# Patient Record
Sex: FEMALE | Race: OTHER
[De-identification: names, ages, dates, MRNs, and addresses within clinical notes are randomized per-mention and may not be internally consistent; named-entity substitution may affect disease eponyms.]

---

## 2017-03-15 ENCOUNTER — HOSPITAL ENCOUNTER (OUTPATIENT)
Dept: HOSPITAL 23 - CMRI | Age: 42
Discharge: HOME | End: 2017-03-15
Payer: COMMERCIAL

## 2017-03-15 DIAGNOSIS — M47.816: ICD-10-CM

## 2017-03-15 DIAGNOSIS — M54.5: Primary | ICD-10-CM

## 2017-03-20 ENCOUNTER — OFFICE VISIT (OUTPATIENT)
Dept: OBSTETRICS AND GYNECOLOGY | Facility: CLINIC | Age: 42
End: 2017-03-20

## 2017-03-20 VITALS — WEIGHT: 225 LBS | HEIGHT: 64 IN | BODY MASS INDEX: 38.41 KG/M2

## 2017-03-20 DIAGNOSIS — N85.2 UTERINE ENLARGEMENT: ICD-10-CM

## 2017-03-20 DIAGNOSIS — N93.9 ABNORMAL UTERINE BLEEDING (AUB): Primary | ICD-10-CM

## 2017-03-20 PROCEDURE — 99203 OFFICE O/P NEW LOW 30 MIN: CPT | Performed by: OBSTETRICS & GYNECOLOGY

## 2017-03-20 RX ORDER — AMLODIPINE BESYLATE 5 MG/1
TABLET ORAL
Refills: 3 | COMMUNITY
Start: 2017-02-04 | End: 2017-09-18 | Stop reason: SDUPTHER

## 2017-03-20 RX ORDER — DICLOFENAC SODIUM 75 MG/1
TABLET, DELAYED RELEASE ORAL
Refills: 0 | COMMUNITY
Start: 2017-02-28 | End: 2017-09-18

## 2017-03-20 RX ORDER — CEPHALEXIN 250 MG/1
CAPSULE ORAL
Refills: 3 | COMMUNITY
Start: 2017-02-04 | End: 2017-09-18

## 2017-03-20 RX ORDER — TIZANIDINE 4 MG/1
TABLET ORAL
Refills: 0 | COMMUNITY
Start: 2017-02-21 | End: 2017-09-18

## 2017-03-20 NOTE — PROGRESS NOTES
Subjective   Susan Gomes is a 42 y.o. female  5, Para 2 AB 3, Living 2.  Last annual 1y, last pap 1y, last mammogram 1y, last colonoscopy 0.  Cc: Heavy bleeding  History of Present Illness  Patient been having heavy menses since  without workup.  His occur every 28 days but last approximately 78 days with heavy flow and passage of clots and severe dysmenorrhea.  Patient denies dyspareunia.  The following portions of the patient's history were reviewed and updated as appropriate: allergies, current medications, past family history, past medical history, past social history, past surgical history and problem list.    Review of Systems   Genitourinary: Positive for menstrual problem.        Dysmenorrhea   All other systems reviewed and are negative.        Past Medical History   Diagnosis Date   • Asthma    • Hypothyroidism      Menstrual History:  OB History      Para Term  AB TAB SAB Ectopic Multiple Living    5 2 2  3 3    2         Menarche age: 10  Patient's last menstrual period was 2017.  Period Duration (Days): 7  Period Pattern: Regular  Menstrual Flow: Moderate, Heavy  Menstrual Control: Maxi pad  Dysmenorrhea: (!) Severe  Dysmenorrhea Symptoms: Cramping, Nausea, Diarrhea, Headache    Past Surgical History   Procedure Laterality Date   • Cholecystectomy     • Thyroidectomy, partial     •  section with tubal     • D&c with suction       ×3     OB History      Para Term  AB TAB SAB Ectopic Multiple Living    5 2 2  3 3    2        Family History   Problem Relation Age of Onset   • Uterine cancer Mother 65     History   Smoking Status   • Never Smoker   Smokeless Tobacco   • Never Used     History   Alcohol Use No     Health Maintenance   Topic Date Due   • TDAP/TD VACCINES (1 - Tdap) 1992   • INFLUENZA VACCINE  2016       Current Outpatient Prescriptions:   •  amLODIPine (NORVASC) 5 MG tablet, TK 1 T PO QD, Disp: , Rfl: 3  •  cephalexin  "(KEFLEX) 250 MG capsule, TK 1 C PO QHS, Disp: , Rfl: 3  •  cetirizine (ZyrTEC) 10 MG tablet, Take 10 mg by mouth Daily., Disp: , Rfl:   •  Cyanocobalamin (B-12) 1000 MCG sublingual tablet, DIS 1 T UNT D, Disp: , Rfl: 0  •  diclofenac (VOLTAREN) 75 MG EC tablet, TAKE 1 TABLET BY MOUTH DAILY, Disp: , Rfl: 0  •  dicyclomine (BENTYL) 10 MG capsule, TK 1 C PO BID PRN, Disp: , Rfl: 0  •  levothyroxine (SYNTHROID, LEVOTHROID) 112 MCG tablet, TK 1 T PO D UTD, Disp: , Rfl: 2  •  omeprazole (PriLOSEC) 40 MG capsule, TK 1 C PO D, Disp: , Rfl: 0  •  Probiotic Product (PROBIOTIC MATURE ADULT) capsule, TK ONE C PO QD, Disp: , Rfl: 2  •  SYMBICORT 160-4.5 MCG/ACT inhaler, INL 2 PFS PO BID, Disp: , Rfl: 2  •  tiZANidine (ZANAFLEX) 4 MG tablet, TK 1 T PO BID PRN, Disp: , Rfl: 0  Sexual History: Active    STD negative    Objective   Vitals:    03/20/17 0953   Weight: 225 lb (102 kg)   Height: 64\" (162.6 cm)     Physical Exam   Constitutional: She is oriented to person, place, and time. She appears well-developed and well-nourished.   Morbid obesity   HENT:   Head: Normocephalic.   Eyes: Pupils are equal, round, and reactive to light.   Neck: Normal range of motion. No thyromegaly present.   Cardiovascular: Normal rate, regular rhythm, normal heart sounds and intact distal pulses.    Pulmonary/Chest: Effort normal and breath sounds normal. No respiratory distress. She exhibits no tenderness. Right breast exhibits no inverted nipple, no mass, no nipple discharge, no skin change and no tenderness. Left breast exhibits no inverted nipple, no mass, no nipple discharge, no skin change and no tenderness. Breasts are symmetrical.   Abdominal: Soft. Bowel sounds are normal. Hernia confirmed negative in the right inguinal area and confirmed negative in the left inguinal area.   Genitourinary: Rectum normal and vagina normal. Rectal exam shows no external hemorrhoid, no internal hemorrhoid, no fissure, no mass, no tenderness and anal tone " normal. No breast tenderness or discharge. Pelvic exam was performed with patient supine. There is no rash, tenderness, lesion or injury on the right labia. There is no rash, tenderness, lesion or injury on the left labia. Uterus is not enlarged and not tender. Cervix exhibits no motion tenderness, no discharge and no friability. Right adnexum displays no mass, no tenderness and no fullness. Left adnexum displays no mass, no tenderness and no fullness.   Genitourinary Comments: Examination is suboptimal due to body habitus but I get the impression that the uterus is enlarged.  old bleeding identified in the vault   Lymphadenopathy:     She has no cervical adenopathy.        Right: No inguinal adenopathy present.        Left: No inguinal adenopathy present.   Neurological: She is alert and oriented to person, place, and time. She has normal reflexes.   Skin: Skin is warm and dry.   Psychiatric: She has a normal mood and affect. Her behavior is normal. Judgment and thought content normal.   Vitals reviewed.        Assessment/Plan   Susan was seen today for menometrorrhagia.    Diagnoses and all orders for this visit:    Menorrhagia  -     US Pelvis Complete    Uterine enlargement

## 2017-03-27 ENCOUNTER — PROCEDURE VISIT (OUTPATIENT)
Dept: OBSTETRICS AND GYNECOLOGY | Facility: CLINIC | Age: 42
End: 2017-03-27

## 2017-03-27 ENCOUNTER — OFFICE VISIT (OUTPATIENT)
Dept: OBSTETRICS AND GYNECOLOGY | Facility: CLINIC | Age: 42
End: 2017-03-27

## 2017-03-27 VITALS
SYSTOLIC BLOOD PRESSURE: 115 MMHG | BODY MASS INDEX: 38.76 KG/M2 | HEART RATE: 84 BPM | WEIGHT: 227 LBS | HEIGHT: 64 IN | DIASTOLIC BLOOD PRESSURE: 72 MMHG

## 2017-03-27 DIAGNOSIS — N92.0 MENORRHAGIA WITH REGULAR CYCLE: Primary | ICD-10-CM

## 2017-03-27 DIAGNOSIS — N93.9 ABNORMAL UTERINE BLEEDING (AUB): Primary | ICD-10-CM

## 2017-03-27 DIAGNOSIS — D25.9 UTERINE LEIOMYOMA, UNSPECIFIED LOCATION: ICD-10-CM

## 2017-03-27 DIAGNOSIS — N85.2 ENLARGED UTERUS: ICD-10-CM

## 2017-03-27 DIAGNOSIS — E66.01 MORBID OBESITY, UNSPECIFIED OBESITY TYPE (HCC): ICD-10-CM

## 2017-03-27 DIAGNOSIS — N92.0 MENORRHAGIA WITH REGULAR CYCLE: ICD-10-CM

## 2017-03-27 PROCEDURE — 76830 TRANSVAGINAL US NON-OB: CPT | Performed by: OBSTETRICS & GYNECOLOGY

## 2017-03-27 PROCEDURE — 58100 BIOPSY OF UTERUS LINING: CPT | Performed by: OBSTETRICS & GYNECOLOGY

## 2017-03-27 NOTE — PROGRESS NOTES
Endometrial Biopsy Procedure Note  She sound demonstrated a 2.6 cm posterior fundal fibroid and endometrial thickening of 9 mm  Pre-operative Diagn nausea osis: Menorrhagia    Post-operative Diagnosis: same    Indications: abnormal uterine bleeding, enlarged uterus    Procedure Details    Urine pregnancy test was done today and result was negative.  The risks (including infection, bleeding, pain, and uterine perforation) and benefits of the procedure were explained to the patient and Verbal informed consent was obtained.  Antibiotic prophylaxis against endocarditis was not indicated.     The patient was placed in the dorsal lithotomy position.  Bimanual exam showed the uterus to be in the anteroflexed position.  A Graves' speculum inserted in the vagina, and the cervix prepped with povidone iodine.  Endocervical curettage with a Kevorkian curette was not performed.     A sharp tenaculum was not applied to the anterior lip of the cervix for stabilization.  A sterile uterine sound was used to sound the uterus to a depth of 11.5 cm.  A Pipelle endometrial aspirator was used to sample the endometrium.  Sample was sent for pathologic examination.    Condition:  Stable    Complications:  None    Plan:    The patient was advised to call for any fever or for prolonged or severe pain or bleeding. She was advised to use OTC analgesics as needed for mild to moderate pain. She was advised to avoid vaginal intercourse for 48 hours or until the bleeding has completely stopped.    Attending Physician Documentation:  I was present for or participated in the entire procedure, including opening and closing.     Patient will return to clinic in approximately 2 weeks for discussion of endometrial ablation

## 2017-03-28 LAB
DX ICD CODE: NORMAL
DX ICD CODE: NORMAL
PATH REPORT.FINAL DX SPEC: NORMAL
PATH REPORT.GROSS SPEC: NORMAL
PATH REPORT.RELEVANT HX SPEC: NORMAL
PATH REPORT.SITE OF ORIGIN SPEC: NORMAL
PATHOLOGIST NAME: NORMAL
PAYMENT PROCEDURE: NORMAL

## 2017-04-24 ENCOUNTER — OFFICE VISIT (OUTPATIENT)
Dept: OBSTETRICS AND GYNECOLOGY | Facility: CLINIC | Age: 42
End: 2017-04-24

## 2017-04-24 VITALS
HEART RATE: 81 BPM | HEIGHT: 64 IN | WEIGHT: 224.6 LBS | SYSTOLIC BLOOD PRESSURE: 132 MMHG | BODY MASS INDEX: 38.35 KG/M2 | DIASTOLIC BLOOD PRESSURE: 80 MMHG

## 2017-04-24 DIAGNOSIS — N92.0 MENORRHAGIA WITH REGULAR CYCLE: Primary | ICD-10-CM

## 2017-04-24 PROCEDURE — 99213 OFFICE O/P EST LOW 20 MIN: CPT | Performed by: OBSTETRICS & GYNECOLOGY

## 2017-04-24 NOTE — PROGRESS NOTES
Subjective   Susan Gomes is a 42 y.o. female.   Cc: Follow-up after endometrial biopsy  History of Present Illness  Biopsy showed weakly proliferative endometrium without evidence of hyperplasia  The following portions of the patient's history were reviewed and updated as appropriate: allergies, current medications, past family history, past medical history, past social history, past surgical history and problem list.    Review of Systems   Constitutional: Positive for fatigue.   All other systems reviewed and are negative.      Objective   Physical Exam   Constitutional: She is oriented to person, place, and time. She appears well-developed and well-nourished.   Neurological: She is alert and oriented to person, place, and time.   Skin: Skin is warm and dry.   Psychiatric: She has a normal mood and affect. Her behavior is normal. Judgment and thought content normal.   Vitals reviewed.      Assessment/Plan   Susan was seen today for follow-up.    Diagnoses and all orders for this visit:    Menorrhagia with regular cycle  Comments:  Try to cycle with OCPs initially with surgical backup as needed    Other orders  -     norgestrel-ethinyl estradiol (LOW-OGESTREL,CRYSELLE) 0.3-30 MG-MCG per tablet; Take 1 tablet by mouth Daily.

## 2017-06-12 ENCOUNTER — HOSPITAL ENCOUNTER (OUTPATIENT)
Dept: HOSPITAL 23 - CWCC | Age: 42
Discharge: HOME | End: 2017-06-12
Attending: FAMILY MEDICINE
Payer: COMMERCIAL

## 2017-06-12 DIAGNOSIS — Z12.31: Primary | ICD-10-CM

## 2017-06-12 PROCEDURE — G0202 SCR MAMMO BI INCL CAD: HCPCS

## 2017-08-22 ENCOUNTER — OFFICE VISIT (OUTPATIENT)
Dept: OBSTETRICS AND GYNECOLOGY | Facility: CLINIC | Age: 42
End: 2017-08-22

## 2017-08-22 VITALS
BODY MASS INDEX: 37.56 KG/M2 | HEART RATE: 90 BPM | DIASTOLIC BLOOD PRESSURE: 75 MMHG | WEIGHT: 220 LBS | HEIGHT: 64 IN | SYSTOLIC BLOOD PRESSURE: 117 MMHG

## 2017-08-22 DIAGNOSIS — N92.0 MENORRHAGIA WITH REGULAR CYCLE: Primary | ICD-10-CM

## 2017-08-22 PROCEDURE — 99213 OFFICE O/P EST LOW 20 MIN: CPT | Performed by: OBSTETRICS & GYNECOLOGY

## 2017-08-22 NOTE — PROGRESS NOTES
Subjective   Susan Gomes is a 42 y.o. female.   EC: Menorrhagia  History of Present Illness  Patient did not respond satisfactorily to OCPs and wants endometrial ablation  The following portions of the patient's history were reviewed and updated as appropriate: allergies, current medications, past family history, past medical history, past social history, past surgical history and problem list.    Review of Systems   Genitourinary: Positive for menstrual problem.   All other systems reviewed and are negative.      Objective   Physical Exam   Constitutional: She is oriented to person, place, and time. She appears well-developed and well-nourished.   Neurological: She is alert and oriented to person, place, and time.   Skin: Skin is warm and dry.   Psychiatric: She has a normal mood and affect. Her behavior is normal. Judgment and thought content normal.   Vitals reviewed.      Assessment/Plan   Susan was seen today for follow-up.    Diagnoses and all orders for this visit:    Menorrhagia with regular cycle  -     Case Request       Patient understands risks benefits side effects complications alternatives  and failure rate of the procedure.  She understands risks of anesthesia blood loss infection.  She understands risk albeit rare of  death blood clots.  DVT, stroke.  She accepts these risks and has elected to proceed with the procedure.

## 2017-08-24 PROBLEM — N92.0 MENORRHAGIA WITH REGULAR CYCLE: Status: ACTIVE | Noted: 2017-08-24

## 2017-09-18 ENCOUNTER — APPOINTMENT (OUTPATIENT)
Dept: PREADMISSION TESTING | Facility: HOSPITAL | Age: 42
End: 2017-09-18

## 2017-09-18 VITALS
SYSTOLIC BLOOD PRESSURE: 135 MMHG | TEMPERATURE: 98.1 F | RESPIRATION RATE: 20 BRPM | WEIGHT: 215.31 LBS | HEART RATE: 76 BPM | HEIGHT: 64 IN | OXYGEN SATURATION: 99 % | DIASTOLIC BLOOD PRESSURE: 86 MMHG | BODY MASS INDEX: 36.76 KG/M2

## 2017-09-18 LAB
ANION GAP SERPL CALCULATED.3IONS-SCNC: 13.9 MMOL/L
BACTERIA UR QL AUTO: ABNORMAL /HPF
BILIRUB UR QL STRIP: NEGATIVE
BUN BLD-MCNC: 15 MG/DL (ref 6–20)
BUN/CREAT SERPL: 26.8 (ref 7–25)
CALCIUM SPEC-SCNC: 9.4 MG/DL (ref 8.6–10.5)
CHLORIDE SERPL-SCNC: 101 MMOL/L (ref 98–107)
CLARITY UR: CLEAR
CO2 SERPL-SCNC: 22.1 MMOL/L (ref 22–29)
COLOR UR: YELLOW
CREAT BLD-MCNC: 0.56 MG/DL (ref 0.57–1)
DEPRECATED RDW RBC AUTO: 45.6 FL (ref 37–54)
ERYTHROCYTE [DISTWIDTH] IN BLOOD BY AUTOMATED COUNT: 15.9 % (ref 11.7–13)
GFR SERPL CREATININE-BSD FRML MDRD: 118 ML/MIN/1.73
GLUCOSE BLD-MCNC: 91 MG/DL (ref 65–99)
GLUCOSE UR STRIP-MCNC: NEGATIVE MG/DL
HCG SERPL QL: NEGATIVE
HCT VFR BLD AUTO: 37.8 % (ref 35.6–45.5)
HGB BLD-MCNC: 12.1 G/DL (ref 11.9–15.5)
HGB UR QL STRIP.AUTO: NEGATIVE
HYALINE CASTS UR QL AUTO: ABNORMAL /LPF
KETONES UR QL STRIP: NEGATIVE
LEUKOCYTE ESTERASE UR QL STRIP.AUTO: NEGATIVE
MCH RBC QN AUTO: 25.4 PG (ref 26.9–32)
MCHC RBC AUTO-ENTMCNC: 32 G/DL (ref 32.4–36.3)
MCV RBC AUTO: 79.2 FL (ref 80.5–98.2)
NITRITE UR QL STRIP: NEGATIVE
PH UR STRIP.AUTO: 7 [PH] (ref 5–8)
PLATELET # BLD AUTO: 332 10*3/MM3 (ref 140–500)
PMV BLD AUTO: 10.4 FL (ref 6–12)
POTASSIUM BLD-SCNC: 4 MMOL/L (ref 3.5–5.2)
PROT UR QL STRIP: NEGATIVE
RBC # BLD AUTO: 4.77 10*6/MM3 (ref 3.9–5.2)
RBC # UR: ABNORMAL /HPF
REF LAB TEST METHOD: ABNORMAL
SODIUM BLD-SCNC: 137 MMOL/L (ref 136–145)
SP GR UR STRIP: 1.02 (ref 1–1.03)
SQUAMOUS #/AREA URNS HPF: ABNORMAL /HPF
UROBILINOGEN UR QL STRIP: NORMAL
WBC NRBC COR # BLD: 10.09 10*3/MM3 (ref 4.5–10.7)
WBC UR QL AUTO: ABNORMAL /HPF

## 2017-09-18 PROCEDURE — 36415 COLL VENOUS BLD VENIPUNCTURE: CPT

## 2017-09-18 PROCEDURE — 80048 BASIC METABOLIC PNL TOTAL CA: CPT | Performed by: OBSTETRICS & GYNECOLOGY

## 2017-09-18 PROCEDURE — 93005 ELECTROCARDIOGRAM TRACING: CPT

## 2017-09-18 PROCEDURE — 81001 URINALYSIS AUTO W/SCOPE: CPT | Performed by: OBSTETRICS & GYNECOLOGY

## 2017-09-18 PROCEDURE — 84703 CHORIONIC GONADOTROPIN ASSAY: CPT | Performed by: OBSTETRICS & GYNECOLOGY

## 2017-09-18 PROCEDURE — 87086 URINE CULTURE/COLONY COUNT: CPT | Performed by: OBSTETRICS & GYNECOLOGY

## 2017-09-18 PROCEDURE — 85027 COMPLETE CBC AUTOMATED: CPT | Performed by: OBSTETRICS & GYNECOLOGY

## 2017-09-18 PROCEDURE — 93010 ELECTROCARDIOGRAM REPORT: CPT | Performed by: INTERNAL MEDICINE

## 2017-09-18 RX ORDER — AMLODIPINE BESYLATE 5 MG/1
5 TABLET ORAL EVERY MORNING
COMMUNITY

## 2017-09-18 RX ORDER — BUDESONIDE AND FORMOTEROL FUMARATE DIHYDRATE 160; 4.5 UG/1; UG/1
2 AEROSOL RESPIRATORY (INHALATION) AS NEEDED
COMMUNITY

## 2017-09-18 RX ORDER — LEVOTHYROXINE SODIUM 112 UG/1
112 TABLET ORAL EVERY MORNING
COMMUNITY

## 2017-09-18 RX ORDER — NICOTINE POLACRILEX 2 MG
1 MINI LOZENGE BUCCAL EVERY EVENING
COMMUNITY

## 2017-09-18 NOTE — DISCHARGE INSTRUCTIONS
Take the following medications the morning of surgery with a small sip of water:    AMLODIPINE   AND LEVOTHYROXINE    General Instructions:  • Do not eat solid food after midnight the night before surgery.  • You may drink clear liquids day of surgery but must stop at least one hour before your hospital arrival time. (1000 AM )  • It is beneficial for you to have a clear drink that contains carbohydrates the day of surgery.  We suggest a 20 ounce bottle of Gatorade or Powerade for non-diabetic patients     Clear liquids are liquids you can see through.  Nothing red in color.     Plain water                               Sports drinks  Sodas                                   Gelatin (Jell-O)  Fruit juices without pulp such as white grape juice and apple juice  Popsicles that contain no fruit or yogurt  Tea or coffee (no cream or milk added)  Gatorade / Powerade  G2 / Powerade Zero      • Patients who avoid smoking, chewing tobacco and alcohol for 4 weeks prior to surgery have a reduced risk of post-operative complications.  Quit smoking as many days before surgery as you can.  • Do not smoke, use chewing tobacco or drink alcohol the day of surgery.   • Bring any papers given to you in the doctor’s office.  • Wear clean comfortable clothes and socks.  • Do not wear contact lenses or make-up.  Bring a case for your glasses.   • Leave all other valuables and jewelry at home.  • The Pre-Admission Testing nurse will instruct you to bring medications if unable to obtain an accurate list in Pre-Admission Testing.   • REPORT TO MAIN SURGERY ON 9- AT 1100       Preventing a Surgical Site Infection:  • For 2 to 3 days before surgery, avoid shaving with a razor because the razor can irritate skin and make it easier to develop an infection.  • The night prior to surgery sleep in a clean bed with clean clothing.  Do not allow pets to sleep with you.  • Shower on the morning of surgery using a fresh bar of anti-bacterial  soap (such as Dial) and clean washcloth.  Dry with a clean towel and dress in clean clothing.  • Ask your surgeon if you will be receiving antibiotics prior to surgery.  • Make sure you, your family, and all healthcare providers clean their hands with soap and water or an alcohol based hand  before caring for you or your wound.    Day of surgery:  Upon arrival, a Pre-op nurse and Anesthesiologist will review your health history, obtain vital signs, and answer questions you may have.  The only belongings needed at this time will be your home medications and if applicable your C-PAP/BI-PAP machine.  If you are staying overnight your family can leave the rest of your belongings in the car and bring them to your room later.  A Pre-op nurse will start an IV and you may receive medication in preparation for surgery, including something to help you relax.  Your family will be able to see you in the Pre-op area.  While you are in surgery your family should notify the waiting room  if they leave the waiting room area and provide a contact phone number.    Please be aware that surgery does come with discomfort.  We want to make every effort to control your discomfort so please discuss any uncontrolled symptoms with your nurse.   Your doctor will most likely have prescribed pain medications.      If you are going home after surgery you will receive individualized written care instructions before being discharged.  A responsible adult must drive you to and from the hospital on the day of your surgery and stay with you for 24 hours.    If you have any questions please call Pre-Admission Testing at [phone]  .  Deductibles and co-payments are collected on the day of service. Please be prepared to pay the required co-pay, deductible or deposit on the day of service as defined by your plan.

## 2017-09-20 LAB — BACTERIA SPEC AEROBE CULT: NO GROWTH

## 2017-09-22 ENCOUNTER — ANESTHESIA (OUTPATIENT)
Dept: PERIOP | Facility: HOSPITAL | Age: 42
End: 2017-09-22

## 2017-09-22 ENCOUNTER — HOSPITAL ENCOUNTER (OUTPATIENT)
Facility: HOSPITAL | Age: 42
Setting detail: HOSPITAL OUTPATIENT SURGERY
Discharge: HOME | End: 2017-09-22
Attending: OBSTETRICS & GYNECOLOGY | Admitting: OBSTETRICS & GYNECOLOGY

## 2017-09-22 ENCOUNTER — ANESTHESIA EVENT (OUTPATIENT)
Dept: PERIOP | Facility: HOSPITAL | Age: 42
End: 2017-09-22

## 2017-09-22 VITALS
BODY MASS INDEX: 36.74 KG/M2 | OXYGEN SATURATION: 99 % | WEIGHT: 215.2 LBS | TEMPERATURE: 98.6 F | DIASTOLIC BLOOD PRESSURE: 86 MMHG | HEART RATE: 76 BPM | HEIGHT: 64 IN | SYSTOLIC BLOOD PRESSURE: 146 MMHG | RESPIRATION RATE: 18 BRPM

## 2017-09-22 DIAGNOSIS — N92.0 MENORRHAGIA WITH REGULAR CYCLE: Primary | ICD-10-CM

## 2017-09-22 PROCEDURE — 25010000002 MIDAZOLAM PER 1 MG: Performed by: ANESTHESIOLOGY

## 2017-09-22 PROCEDURE — 25010000002 ONDANSETRON PER 1 MG: Performed by: NURSE ANESTHETIST, CERTIFIED REGISTERED

## 2017-09-22 PROCEDURE — 58563 HYSTEROSCOPY ABLATION: CPT | Performed by: OBSTETRICS & GYNECOLOGY

## 2017-09-22 PROCEDURE — 25010000002 PROPOFOL 10 MG/ML EMULSION: Performed by: NURSE ANESTHETIST, CERTIFIED REGISTERED

## 2017-09-22 PROCEDURE — 25010000002 FENTANYL CITRATE (PF) 100 MCG/2ML SOLUTION: Performed by: NURSE ANESTHETIST, CERTIFIED REGISTERED

## 2017-09-22 PROCEDURE — 25010000002 DEXAMETHASONE PER 1 MG: Performed by: NURSE ANESTHETIST, CERTIFIED REGISTERED

## 2017-09-22 PROCEDURE — 25010000002 SUCCINYLCHOLINE PER 20 MG: Performed by: NURSE ANESTHETIST, CERTIFIED REGISTERED

## 2017-09-22 PROCEDURE — S0260 H&P FOR SURGERY: HCPCS | Performed by: OBSTETRICS & GYNECOLOGY

## 2017-09-22 RX ORDER — LABETALOL HYDROCHLORIDE 5 MG/ML
5 INJECTION, SOLUTION INTRAVENOUS
Status: DISCONTINUED | OUTPATIENT
Start: 2017-09-22 | End: 2017-09-22 | Stop reason: HOSPADM

## 2017-09-22 RX ORDER — FLUMAZENIL 0.1 MG/ML
0.2 INJECTION INTRAVENOUS AS NEEDED
Status: DISCONTINUED | OUTPATIENT
Start: 2017-09-22 | End: 2017-09-22 | Stop reason: HOSPADM

## 2017-09-22 RX ORDER — FENTANYL CITRATE 50 UG/ML
50 INJECTION, SOLUTION INTRAMUSCULAR; INTRAVENOUS
Status: DISCONTINUED | OUTPATIENT
Start: 2017-09-22 | End: 2017-09-22 | Stop reason: HOSPADM

## 2017-09-22 RX ORDER — PROMETHAZINE HYDROCHLORIDE 25 MG/1
25 TABLET ORAL ONCE AS NEEDED
Status: DISCONTINUED | OUTPATIENT
Start: 2017-09-22 | End: 2017-09-22 | Stop reason: HOSPADM

## 2017-09-22 RX ORDER — HYDROCODONE BITARTRATE AND ACETAMINOPHEN 5; 325 MG/1; MG/1
1 TABLET ORAL ONCE AS NEEDED
Status: CANCELLED | OUTPATIENT
Start: 2017-09-22 | End: 2017-10-02

## 2017-09-22 RX ORDER — PROMETHAZINE HYDROCHLORIDE 25 MG/ML
5 INJECTION, SOLUTION INTRAMUSCULAR; INTRAVENOUS
Status: DISCONTINUED | OUTPATIENT
Start: 2017-09-22 | End: 2017-09-22 | Stop reason: HOSPADM

## 2017-09-22 RX ORDER — DEXAMETHASONE SODIUM PHOSPHATE 10 MG/ML
INJECTION INTRAMUSCULAR; INTRAVENOUS AS NEEDED
Status: DISCONTINUED | OUTPATIENT
Start: 2017-09-22 | End: 2017-09-22 | Stop reason: SURG

## 2017-09-22 RX ORDER — SODIUM CHLORIDE, SODIUM LACTATE, POTASSIUM CHLORIDE, CALCIUM CHLORIDE 600; 310; 30; 20 MG/100ML; MG/100ML; MG/100ML; MG/100ML
9 INJECTION, SOLUTION INTRAVENOUS CONTINUOUS
Status: DISCONTINUED | OUTPATIENT
Start: 2017-09-22 | End: 2017-09-22 | Stop reason: HOSPADM

## 2017-09-22 RX ORDER — MAGNESIUM HYDROXIDE 1200 MG/15ML
LIQUID ORAL AS NEEDED
Status: DISCONTINUED | OUTPATIENT
Start: 2017-09-22 | End: 2017-09-22 | Stop reason: HOSPADM

## 2017-09-22 RX ORDER — MIDAZOLAM HYDROCHLORIDE 1 MG/ML
1 INJECTION INTRAMUSCULAR; INTRAVENOUS
Status: DISCONTINUED | OUTPATIENT
Start: 2017-09-22 | End: 2017-09-22 | Stop reason: HOSPADM

## 2017-09-22 RX ORDER — PROMETHAZINE HYDROCHLORIDE 25 MG/1
12.5 TABLET ORAL ONCE AS NEEDED
Status: DISCONTINUED | OUTPATIENT
Start: 2017-09-22 | End: 2017-09-22 | Stop reason: HOSPADM

## 2017-09-22 RX ORDER — SUCCINYLCHOLINE CHLORIDE 20 MG/ML
INJECTION INTRAMUSCULAR; INTRAVENOUS AS NEEDED
Status: DISCONTINUED | OUTPATIENT
Start: 2017-09-22 | End: 2017-09-22 | Stop reason: SURG

## 2017-09-22 RX ORDER — NALOXONE HCL 0.4 MG/ML
0.2 VIAL (ML) INJECTION AS NEEDED
Status: DISCONTINUED | OUTPATIENT
Start: 2017-09-22 | End: 2017-09-22 | Stop reason: HOSPADM

## 2017-09-22 RX ORDER — MIDAZOLAM HYDROCHLORIDE 1 MG/ML
2 INJECTION INTRAMUSCULAR; INTRAVENOUS
Status: DISCONTINUED | OUTPATIENT
Start: 2017-09-22 | End: 2017-09-22 | Stop reason: HOSPADM

## 2017-09-22 RX ORDER — OXYCODONE AND ACETAMINOPHEN 7.5; 325 MG/1; MG/1
1 TABLET ORAL ONCE AS NEEDED
Status: DISCONTINUED | OUTPATIENT
Start: 2017-09-22 | End: 2017-09-22 | Stop reason: HOSPADM

## 2017-09-22 RX ORDER — DIPHENHYDRAMINE HYDROCHLORIDE 50 MG/ML
12.5 INJECTION INTRAMUSCULAR; INTRAVENOUS
Status: DISCONTINUED | OUTPATIENT
Start: 2017-09-22 | End: 2017-09-22 | Stop reason: HOSPADM

## 2017-09-22 RX ORDER — SODIUM CHLORIDE 0.9 % (FLUSH) 0.9 %
1-10 SYRINGE (ML) INJECTION AS NEEDED
Status: DISCONTINUED | OUTPATIENT
Start: 2017-09-22 | End: 2017-09-22 | Stop reason: HOSPADM

## 2017-09-22 RX ORDER — SODIUM CHLORIDE 9 MG/ML
INJECTION, SOLUTION INTRAVENOUS AS NEEDED
Status: DISCONTINUED | OUTPATIENT
Start: 2017-09-22 | End: 2017-09-22 | Stop reason: HOSPADM

## 2017-09-22 RX ORDER — ONDANSETRON 2 MG/ML
4 INJECTION INTRAMUSCULAR; INTRAVENOUS ONCE AS NEEDED
Status: DISCONTINUED | OUTPATIENT
Start: 2017-09-22 | End: 2017-09-22 | Stop reason: HOSPADM

## 2017-09-22 RX ORDER — FENTANYL CITRATE 50 UG/ML
INJECTION, SOLUTION INTRAMUSCULAR; INTRAVENOUS AS NEEDED
Status: DISCONTINUED | OUTPATIENT
Start: 2017-09-22 | End: 2017-09-22 | Stop reason: SURG

## 2017-09-22 RX ORDER — FAMOTIDINE 10 MG/ML
20 INJECTION, SOLUTION INTRAVENOUS ONCE
Status: COMPLETED | OUTPATIENT
Start: 2017-09-22 | End: 2017-09-22

## 2017-09-22 RX ORDER — LIDOCAINE HYDROCHLORIDE 20 MG/ML
INJECTION, SOLUTION INFILTRATION; PERINEURAL AS NEEDED
Status: DISCONTINUED | OUTPATIENT
Start: 2017-09-22 | End: 2017-09-22 | Stop reason: SURG

## 2017-09-22 RX ORDER — OXYCODONE HYDROCHLORIDE AND ACETAMINOPHEN 5; 325 MG/1; MG/1
1-2 TABLET ORAL EVERY 4 HOURS PRN
Qty: 20 TABLET | Refills: 0 | COMMUNITY
Start: 2017-09-22

## 2017-09-22 RX ORDER — PROMETHAZINE HYDROCHLORIDE 25 MG/ML
12.5 INJECTION, SOLUTION INTRAMUSCULAR; INTRAVENOUS ONCE AS NEEDED
Status: DISCONTINUED | OUTPATIENT
Start: 2017-09-22 | End: 2017-09-22 | Stop reason: HOSPADM

## 2017-09-22 RX ORDER — EPHEDRINE SULFATE 50 MG/ML
5 INJECTION, SOLUTION INTRAVENOUS ONCE AS NEEDED
Status: DISCONTINUED | OUTPATIENT
Start: 2017-09-22 | End: 2017-09-22 | Stop reason: HOSPADM

## 2017-09-22 RX ORDER — PROPOFOL 10 MG/ML
VIAL (ML) INTRAVENOUS AS NEEDED
Status: DISCONTINUED | OUTPATIENT
Start: 2017-09-22 | End: 2017-09-22 | Stop reason: SURG

## 2017-09-22 RX ORDER — HYDROCODONE BITARTRATE AND ACETAMINOPHEN 7.5; 325 MG/1; MG/1
1 TABLET ORAL ONCE AS NEEDED
Status: COMPLETED | OUTPATIENT
Start: 2017-09-22 | End: 2017-09-22

## 2017-09-22 RX ORDER — PROMETHAZINE HYDROCHLORIDE 25 MG/1
25 SUPPOSITORY RECTAL ONCE AS NEEDED
Status: DISCONTINUED | OUTPATIENT
Start: 2017-09-22 | End: 2017-09-22 | Stop reason: HOSPADM

## 2017-09-22 RX ORDER — HYDRALAZINE HYDROCHLORIDE 20 MG/ML
5 INJECTION INTRAMUSCULAR; INTRAVENOUS
Status: DISCONTINUED | OUTPATIENT
Start: 2017-09-22 | End: 2017-09-22 | Stop reason: HOSPADM

## 2017-09-22 RX ORDER — HYDROMORPHONE HYDROCHLORIDE 1 MG/ML
0.5 INJECTION, SOLUTION INTRAMUSCULAR; INTRAVENOUS; SUBCUTANEOUS
Status: DISCONTINUED | OUTPATIENT
Start: 2017-09-22 | End: 2017-09-22 | Stop reason: HOSPADM

## 2017-09-22 RX ORDER — ONDANSETRON 2 MG/ML
INJECTION INTRAMUSCULAR; INTRAVENOUS AS NEEDED
Status: DISCONTINUED | OUTPATIENT
Start: 2017-09-22 | End: 2017-09-22 | Stop reason: SURG

## 2017-09-22 RX ADMIN — PROPOFOL 200 MG: 10 INJECTION, EMULSION INTRAVENOUS at 13:11

## 2017-09-22 RX ADMIN — FAMOTIDINE 20 MG: 10 INJECTION, SOLUTION INTRAVENOUS at 12:48

## 2017-09-22 RX ADMIN — MIDAZOLAM 2 MG: 1 INJECTION INTRAMUSCULAR; INTRAVENOUS at 12:48

## 2017-09-22 RX ADMIN — FENTANYL CITRATE 50 MCG: 50 INJECTION INTRAMUSCULAR; INTRAVENOUS at 13:20

## 2017-09-22 RX ADMIN — LIDOCAINE HYDROCHLORIDE 100 MG: 20 INJECTION, SOLUTION INFILTRATION; PERINEURAL at 13:11

## 2017-09-22 RX ADMIN — SUCCINYLCHOLINE CHLORIDE 120 MG: 20 INJECTION, SOLUTION INTRAMUSCULAR; INTRAVENOUS; PARENTERAL at 13:11

## 2017-09-22 RX ADMIN — DEXAMETHASONE SODIUM PHOSPHATE 8 MG: 10 INJECTION INTRAMUSCULAR; INTRAVENOUS at 13:25

## 2017-09-22 RX ADMIN — HYDROCODONE BITARTRATE AND ACETAMINOPHEN 1 TABLET: 7.5; 325 TABLET ORAL at 14:37

## 2017-09-22 RX ADMIN — SODIUM CHLORIDE, POTASSIUM CHLORIDE, SODIUM LACTATE AND CALCIUM CHLORIDE 9 ML/HR: 600; 310; 30; 20 INJECTION, SOLUTION INTRAVENOUS at 12:48

## 2017-09-22 RX ADMIN — ONDANSETRON 4 MG: 2 INJECTION INTRAMUSCULAR; INTRAVENOUS at 13:32

## 2017-09-22 RX ADMIN — FENTANYL CITRATE 50 MCG: 50 INJECTION INTRAMUSCULAR; INTRAVENOUS at 13:11

## 2017-09-22 NOTE — ANESTHESIA PROCEDURE NOTES
Airway  Urgency: elective    Airway not difficult    General Information and Staff    Patient location during procedure: OR  Anesthesiologist: JOSELITO MEYERS  CRNA: LOUISA SIDDIQUI    Indications and Patient Condition  Indications for airway management: airway protection    Preoxygenated: yes  MILS maintained throughout  Mask difficulty assessment: 1 - vent by mask    Final Airway Details  Final airway type: endotracheal airway      Successful airway: ETT    Successful intubation technique: direct laryngoscopy  Facilitating devices/methods: intubating stylet  Endotracheal tube insertion site: oral  Blade: Silva  Blade size: #2  ETT size: 7.0 mm  Cormack-Lehane Classification: grade IIa - partial view of glottis  Placement verified by: chest auscultation and capnometry   Measured from: lips  ETT to lips (cm): 20  Number of attempts at approach: 1    Additional Comments  Smooth iv induction with no complications

## 2017-09-22 NOTE — DISCHARGE INSTRUCTIONS
HOW DO I REST MY PELVIS?  For as long as told by your health care provider:  · Do not have sex, sexual stimulation, or an orgasm.  · Do not use tampons. Do not douche. Do not put anything in your vagina.  · Avoid activities that take a lot of effort (are strenuous).  · Avoid any activity in which your pelvic muscles could become strained.    Outpatient Surgery Guidelines, Adult  Outpatient procedures are those for which the person having the procedure is allowed to go home the same day as the procedure. Various procedures are done on an outpatient basis. You should follow some general guidelines if you will be having an outpatient procedure.  AFTER THE  PROCEDURE  After surgery, you will be taken to a recovery area, where your progress will be monitored. If there are no complications, you will be allowed to go home when you are awake, stable, and taking fluids well. You may have numbness around the surgical site. Healing will take some time. You will have tenderness at the surgical site and may have some swelling and bruising. You may also have some nausea.  HOME CARE INSTRUCTIONS  · Do not drive for 24 hours, or as directed by your health care provider. Do not drive while taking prescription pain medicines.  · Do not drink alcohol for 24 hours.  · Do not make important decisions or sign legal documents for 24 hours.  · Plan on having a responsible adult stay with your for 24 hours following your procedure.  · You may resume a normal diet and activities as directed.  · Do not lift anything heavier than 10 pounds (4.5 kg) or play contact sports until your health care provider says it is okay.  · Only take over-the-counter or prescription medicines as directed by your health care provider.  · Follow up with your health care provider as directed.  SEEK MEDICAL CARE IF:  · You have increased bleeding (more than a small spot) from the surgical site.  · You have redness, swelling, or increasing pain in the wound.  · You  see pus coming from the wound.  · You have a fever > 101.  · You notice a bad smell coming from the wound or dressing.  · You feel lightheaded or faint.  · You develop a rash.  · You have trouble breathing.  · You develop allergies.  MAKE SURE YOU:  · Understand these instructions.  · Will watch your condition.  · Will get help right away if you are not doing well or get worse.

## 2017-09-22 NOTE — ANESTHESIA POSTPROCEDURE EVALUATION
"Patient: Susan Gomes    Procedure Summary     Date Anesthesia Start Anesthesia Stop Room / Location    09/22/17 1310 1408  [facility] / [facility]       Procedure Diagnosis Surgeon Provider    HYSTEROSCOPY WITH NOVASURE ENDOMETRIAL ABLATION  (N/A Vagina) Menorrhagia with regular cycle  (Menorrhagia with regular cycle [N92.0]) MD Sean Lowe MD          Anesthesia Type: general  Last vitals  BP        Temp        Pulse       Resp        SpO2          Post Anesthesia Care and Evaluation    Patient location during evaluation: PHASE II  Patient participation: complete - patient participated  Level of consciousness: awake  Pain management: adequate  Airway patency: patent  Anesthetic complications: No anesthetic complications    Cardiovascular status: acceptable  Respiratory status: acceptable  Hydration status: acceptable    Comments: /83  Pulse 70  Temp 36.9 °C (98.5 °F) (Oral)   Resp 18  Ht 64\" (162.6 cm)  Wt 215 lb 3.2 oz (97.6 kg)  LMP 09/04/2017  SpO2 97%  BMI 36.94 kg/m2      "

## 2017-09-22 NOTE — ANESTHESIA PREPROCEDURE EVALUATION
Anesthesia Evaluation     Patient summary reviewed and Nursing notes reviewed   NPO Solid Status: > 8 hours  NPO Liquid Status: > 2 hours     Airway   Mallampati: II  TM distance: <3 FB  possible difficult intubation  Dental - normal exam     Pulmonary     breath sounds clear to auscultation  (+) asthma,   Cardiovascular     Rhythm: regular    (+) hypertension,       Neuro/Psych  GI/Hepatic/Renal/Endo    (+) obesity, morbid obesity, GERD, hypothyroidism,     Musculoskeletal     Abdominal   (+) obese,    Substance History      OB/GYN          Other                                        Anesthesia Plan    ASA 2     general     intravenous induction   Anesthetic plan and risks discussed with patient.

## 2017-09-22 NOTE — PLAN OF CARE
Problem: Patient Care Overview (Adult)  Goal: Plan of Care Review  Outcome: Outcome(s) achieved Date Met:  09/22/17 09/22/17 1620   Coping/Psychosocial Response Interventions   Plan Of Care Reviewed With patient;spouse   Patient Care Overview   Progress progress toward functional goals as expected   Outcome Evaluation   Outcome Summary/Follow up Plan ready for discharge       Goal: Adult Individualization and Mutuality  Outcome: Outcome(s) achieved Date Met:  09/22/17  Goal: Discharge Needs Assessment  Outcome: Outcome(s) achieved Date Met:  09/22/17 09/22/17 1620   Discharge Needs Assessment   Concerns To Be Addressed no discharge needs identified         Problem: Perioperative Period (Adult)  Goal: Signs and Symptoms of Listed Potential Problems Will be Absent or Manageable (Perioperative Period)  Outcome: Outcome(s) achieved Date Met:  09/22/17 09/22/17 1620   Perioperative Period   Problems Assessed (Perioperative Period) all   Problems Present (Perioperative Period) pain

## 2017-09-22 NOTE — PLAN OF CARE
Problem: Patient Care Overview (Adult)  Goal: Plan of Care Review  Outcome: Ongoing (interventions implemented as appropriate)    09/22/17 1202   Coping/Psychosocial Response Interventions   Plan Of Care Reviewed With patient   Patient Care Overview   Progress no change       Goal: Adult Individualization and Mutuality  Outcome: Ongoing (interventions implemented as appropriate)    09/22/17 1202   Individualization   Patient Specific Preferences none         09/22/17 1202   Individualization   Patient Specific Preferences none       Goal: Discharge Needs Assessment  Outcome: Ongoing (interventions implemented as appropriate)    09/22/17 1202   Discharge Needs Assessment   Concerns To Be Addressed no discharge needs identified;denies needs/concerns at this time   Equipment Needed After Discharge none   Self-Care   Equipment Currently Used at Home none         Problem: Perioperative Period (Adult)  Goal: Signs and Symptoms of Listed Potential Problems Will be Absent or Manageable (Perioperative Period)  Outcome: Ongoing (interventions implemented as appropriate)    09/22/17 1202   Perioperative Period   Problems Assessed (Perioperative Period) pain;infection;physiologic stress response   Problems Present (Perioperative Period) pain;physiologic stress response

## 2017-09-22 NOTE — H&P
Patient Care Team:  Dolores Mccain MD as PCP - General (Family Medicine)    Chief complaint menorrhagia    Subjective     History of Present Illness  Is a 42-year-old female is had a long history of heavy bleeding.  She was seen initially in March which time ultrasound was performed demonstrating a 2.6 cm posterior fibroid not impinging on the endometrial cavity.  Endometrial biopsy showed poorly proliferative endometrium.  She is tried on several cycles of OCPs but was not satisfied with the results and is elected to get an endometrial ablation  Review of Systems   Noncontributory  Past Medical History:   Diagnosis Date   • Abnormal vaginal bleeding    • Achilles tendon pain     LEFT   • Acid reflux    • Anemia    • Asthma    • History of MRSA infection 2016    UNDER LT ARM   • Hypertension    • Hypothyroidism     HYPOTHYROID   • Seasonal allergies      Past Surgical History:   Procedure Laterality Date   •  SECTION WITH TUBAL     • CHOLECYSTECTOMY     • THYROIDECTOMY, PARTIAL  1990     Family History   Problem Relation Age of Onset   • Uterine cancer Mother 65   • Malig Hyperthermia Neg Hx      Social History   Substance Use Topics   • Smoking status: Never Smoker   • Smokeless tobacco: Never Used   • Alcohol use No     Prescriptions Prior to Admission   Medication Sig Dispense Refill Last Dose   • amLODIPine (NORVASC) 5 MG tablet Take 5 mg by mouth Every Morning.      • budesonide-formoterol (SYMBICORT) 160-4.5 MCG/ACT inhaler Inhale 2 puffs As Needed.      • cetirizine (ZyrTEC) 10 MG tablet Take 10 mg by mouth Daily.   Taking   • Cyanocobalamin 1000 MCG sublingual tablet Place 1,000 mcg under the tongue Every Evening.      • levothyroxine (SYNTHROID, LEVOTHROID) 112 MCG tablet Take 112 mcg by mouth Every Morning.      • Probiotic Product (PROBIOTIC MATURE ADULT) capsule Take 1 tablet by mouth Every Evening.        Allergies:  Sulfa antibiotics    Objective      Vital Signs       Physical  Exam  This is well-developed well-nourished female in no acute distress  HEENT: Within normal limits  Neck: Supple without masses or thyromegaly  Cardiovascular: Normal sinus rhythm without murmur or gallop  Lungs: Clear to percussion and auscultation  Breasts: Equal without masses nipples are everted no discharge is noted  Abdomen: Soft nontender no masses or organomegaly  Pelvic: Deferred  Extremities: Within normal limits  Neurologic: Intact  Results Review:   I reviewed the patient's new clinical results.      Assessment/Plan     Principal Problem:    Menorrhagia with regular cycle      Assessment & Plan  Menorrhagia  Plan hysteroscopy possible D&C and NovaSure endometrial ablation  I discussed the patients findings and my recommendations with patient    Christian Mistry MD  09/22/17  11:09 AM

## 2017-09-22 NOTE — OP NOTE
HYSTEROSCOPY NOVASURE ENDOMETRIAL ABLATION  Procedure Note    Susan Gomes  9/22/2017    Pre-op Diagnosis:   Menorrhagia with regular cycle [N92.0]    Post-op Diagnosis:     Post-Op Diagnosis Codes:     * Menorrhagia with regular cycle [N92.0]    Procedure/CPT® Codes:      Procedure(s):  HYSTEROSCOPY WITH NOVASURE ENDOMETRIAL ABLATION   Under satisfactory general anesthesia patient was placed the dorsolithotomy position prepped and draped usual fashion for vaginal procedure.  Weighted speculum was placed in the vagina and cervix grasped with single-tooth tenaculum.  Uterus sounded to approximately 9 cm.  Cervix then serially dilated with Hanks dilators to #18.  Hysteroscope was then introduced into the uterine cavity and essentially normal anatomy was identified.  Both tubal ostia were easily identified.  Hysteroscope was removed and the NovaSure endometrial ablation device was introduced.  Length of 5 cm width 4.1 cm and the power setting of 113 procedure was performed in the time of 1 minute 29 seconds.  Completion of this the instrument was removed, hysteroscope was reintroduced into the uterine cavity with evidence of good ablation.  Tenaculum was then removed after removal of the hysteroscope.  And the procedure was terminated.  Patient tolerated procedure well, sponge count was correct and patient went to recovery room in satisfactory condition.  Surgeon(s):  Christian Mistry MD    Anesthesia: General    Staff:   Circulator: Casandra Ricks RN  Scrub Person: Jeromy Vickers  Vendor Representative: Shankar Starr    Estimated Blood Loss: Minimal  Urine Voided: * No values recorded between 9/22/2017  1:06 PM and 9/22/2017  1:49 PM *    Specimens:                * No specimens in log *      Drains: None      [REMOVED] Urethral Catheter 09/22/17 1325 100% silicone 14 0 0 (Removed)   Removed 09/22/17 1326       Findings: Normal uterine anatomy,see dictation    Complications: None      Christian Mistry MD      Date: 9/22/2017  Time: 2:00 PM    EMR Dragon/Transcription disclaimer:   Much of this encounter note is an electronic transcription/translation of spoken language to printed text. The electronic translation of spoken language may permit erroneous, or at times, nonsensical words or phrases to be inadvertently transcribed. Although I have reviewed the note for such errors, some may still exist.

## 2019-09-23 ENCOUNTER — LAB REQUISITION (OUTPATIENT)
Dept: LAB | Facility: HOSPITAL | Age: 44
End: 2019-09-23

## 2019-09-23 DIAGNOSIS — K21.9 GASTRO-ESOPHAGEAL REFLUX DISEASE WITHOUT ESOPHAGITIS: ICD-10-CM

## 2019-09-23 PROCEDURE — 88305 TISSUE EXAM BY PATHOLOGIST: CPT | Performed by: INTERNAL MEDICINE

## 2019-09-23 PROCEDURE — 88312 SPECIAL STAINS GROUP 1: CPT | Performed by: INTERNAL MEDICINE

## 2019-09-24 LAB
LAB AP CASE REPORT: NORMAL
PATH REPORT.FINAL DX SPEC: NORMAL
PATH REPORT.GROSS SPEC: NORMAL

## 2023-01-23 ENCOUNTER — TRANSCRIBE ORDERS (OUTPATIENT)
Dept: ADMINISTRATIVE | Facility: HOSPITAL | Age: 48
End: 2023-01-23
Payer: MEDICAID

## 2023-01-23 ENCOUNTER — HOSPITAL ENCOUNTER (OUTPATIENT)
Dept: GENERAL RADIOLOGY | Facility: HOSPITAL | Age: 48
Discharge: HOME | End: 2023-01-23
Payer: MEDICAID

## 2023-01-23 ENCOUNTER — LAB (OUTPATIENT)
Dept: LAB | Facility: HOSPITAL | Age: 48
End: 2023-01-23
Payer: MEDICAID

## 2023-01-23 DIAGNOSIS — Z79.899 ENCOUNTER FOR LONG-TERM (CURRENT) USE OF OTHER MEDICATIONS: ICD-10-CM

## 2023-01-23 DIAGNOSIS — E55.9 VITAMIN D DEFICIENCY: ICD-10-CM

## 2023-01-23 DIAGNOSIS — M25.50 PAIN IN JOINT, MULTIPLE SITES: ICD-10-CM

## 2023-01-23 DIAGNOSIS — M25.50 PAIN IN JOINT, MULTIPLE SITES: Primary | ICD-10-CM

## 2023-01-23 DIAGNOSIS — R76.8 FALSE POSITIVE SEROLOGICAL TEST FOR SYPHILIS: ICD-10-CM

## 2023-01-23 LAB
ALBUMIN SERPL-MCNC: 4.3 G/DL (ref 3.5–5.2)
ALBUMIN/GLOB SERPL: 1 G/DL
ALP SERPL-CCNC: 126 U/L (ref 39–117)
ALT SERPL W P-5'-P-CCNC: 20 U/L (ref 1–33)
ANION GAP SERPL CALCULATED.3IONS-SCNC: 11.1 MMOL/L (ref 5–15)
AST SERPL-CCNC: 22 U/L (ref 1–32)
BASOPHILS # BLD AUTO: 0.06 10*3/MM3 (ref 0–0.2)
BASOPHILS NFR BLD AUTO: 0.6 % (ref 0–1.5)
BILIRUB SERPL-MCNC: 0.4 MG/DL (ref 0–1.2)
BILIRUB UR QL STRIP: NEGATIVE
BUN SERPL-MCNC: 10 MG/DL (ref 6–20)
BUN/CREAT SERPL: 15.4 (ref 7–25)
CALCIUM SPEC-SCNC: 9.6 MG/DL (ref 8.6–10.5)
CHLORIDE SERPL-SCNC: 101 MMOL/L (ref 98–107)
CLARITY UR: CLEAR
CO2 SERPL-SCNC: 24.9 MMOL/L (ref 22–29)
COLOR UR: YELLOW
CREAT SERPL-MCNC: 0.65 MG/DL (ref 0.57–1)
DEPRECATED RDW RBC AUTO: 40.9 FL (ref 37–54)
EGFRCR SERPLBLD CKD-EPI 2021: 108.1 ML/MIN/1.73
EOSINOPHIL # BLD AUTO: 0.15 10*3/MM3 (ref 0–0.4)
EOSINOPHIL NFR BLD AUTO: 1.4 % (ref 0.3–6.2)
ERYTHROCYTE [DISTWIDTH] IN BLOOD BY AUTOMATED COUNT: 14.3 % (ref 12.3–15.4)
GLOBULIN UR ELPH-MCNC: 4.1 GM/DL
GLUCOSE SERPL-MCNC: 118 MG/DL (ref 65–99)
GLUCOSE UR STRIP-MCNC: NEGATIVE MG/DL
HCT VFR BLD AUTO: 42.8 % (ref 34–46.6)
HGB BLD-MCNC: 13.8 G/DL (ref 12–15.9)
HGB UR QL STRIP.AUTO: NEGATIVE
IMM GRANULOCYTES # BLD AUTO: 0.04 10*3/MM3 (ref 0–0.05)
IMM GRANULOCYTES NFR BLD AUTO: 0.4 % (ref 0–0.5)
KETONES UR QL STRIP: NEGATIVE
LEUKOCYTE ESTERASE UR QL STRIP.AUTO: NEGATIVE
LYMPHOCYTES # BLD AUTO: 4.22 10*3/MM3 (ref 0.7–3.1)
LYMPHOCYTES NFR BLD AUTO: 40.1 % (ref 19.6–45.3)
MCH RBC QN AUTO: 25.9 PG (ref 26.6–33)
MCHC RBC AUTO-ENTMCNC: 32.2 G/DL (ref 31.5–35.7)
MCV RBC AUTO: 80.3 FL (ref 79–97)
MONOCYTES # BLD AUTO: 0.5 10*3/MM3 (ref 0.1–0.9)
MONOCYTES NFR BLD AUTO: 4.8 % (ref 5–12)
NEUTROPHILS NFR BLD AUTO: 5.55 10*3/MM3 (ref 1.7–7)
NEUTROPHILS NFR BLD AUTO: 52.7 % (ref 42.7–76)
NITRITE UR QL STRIP: NEGATIVE
NRBC BLD AUTO-RTO: 0.1 /100 WBC (ref 0–0.2)
PH UR STRIP.AUTO: 6.5 [PH] (ref 5–8)
PLATELET # BLD AUTO: 324 10*3/MM3 (ref 140–450)
PMV BLD AUTO: 10.6 FL (ref 6–12)
POTASSIUM SERPL-SCNC: 4 MMOL/L (ref 3.5–5.2)
PROT SERPL-MCNC: 8.4 G/DL (ref 6–8.5)
PROT UR QL STRIP: NEGATIVE
RBC # BLD AUTO: 5.33 10*6/MM3 (ref 3.77–5.28)
SODIUM SERPL-SCNC: 137 MMOL/L (ref 136–145)
SP GR UR STRIP: 1.01 (ref 1–1.03)
UROBILINOGEN UR QL STRIP: NORMAL
WBC NRBC COR # BLD: 10.52 10*3/MM3 (ref 3.4–10.8)

## 2023-01-23 PROCEDURE — 86376 MICROSOMAL ANTIBODY EACH: CPT

## 2023-01-23 PROCEDURE — 86235 NUCLEAR ANTIGEN ANTIBODY: CPT

## 2023-01-23 PROCEDURE — 81374 HLA I TYPING 1 ANTIGEN LR: CPT

## 2023-01-23 PROCEDURE — 72110 X-RAY EXAM L-2 SPINE 4/>VWS: CPT

## 2023-01-23 PROCEDURE — 86800 THYROGLOBULIN ANTIBODY: CPT

## 2023-01-23 PROCEDURE — 86431 RHEUMATOID FACTOR QUANT: CPT

## 2023-01-23 PROCEDURE — 80053 COMPREHEN METABOLIC PANEL: CPT

## 2023-01-23 PROCEDURE — 81001 URINALYSIS AUTO W/SCOPE: CPT

## 2023-01-23 PROCEDURE — 85025 COMPLETE CBC W/AUTO DIFF WBC: CPT

## 2023-01-23 PROCEDURE — 36415 COLL VENOUS BLD VENIPUNCTURE: CPT

## 2023-01-23 PROCEDURE — 72202 X-RAY EXAM SI JOINTS 3/> VWS: CPT

## 2023-01-24 LAB
BACTERIA UR QL AUTO: ABNORMAL /HPF
CHROMATIN AB SERPL-ACNC: 1.6 AI (ref 0–0.9)
DSDNA AB SER-ACNC: 1 IU/ML (ref 0–9)
ENA RNP AB SER-ACNC: 2.1 AI (ref 0–0.9)
ENA SM AB SER-ACNC: 2.3 AI (ref 0–0.9)
ENA SS-A AB SER-ACNC: <0.2 AI (ref 0–0.9)
ENA SS-B AB SER-ACNC: <0.2 AI (ref 0–0.9)
HYALINE CASTS UR QL AUTO: ABNORMAL /LPF
RBC # UR STRIP: ABNORMAL /HPF
REF LAB TEST METHOD: ABNORMAL
RHEUMATOID FACT SERPL-ACNC: <10 IU/ML
SQUAMOUS #/AREA URNS HPF: ABNORMAL /HPF
THYROGLOB AB SERPL-ACNC: <1 IU/ML (ref 0–0.9)
THYROPEROXIDASE AB SERPL-ACNC: 16 IU/ML (ref 0–34)
WBC # UR STRIP: ABNORMAL /HPF

## 2023-01-31 LAB — HLA-B27 QL NAA+PROBE: NEGATIVE

## 2023-02-22 ENCOUNTER — HOSPITAL ENCOUNTER (OUTPATIENT)
Dept: PHYSICAL THERAPY | Facility: HOSPITAL | Age: 48
Setting detail: THERAPIES SERIES
Discharge: HOME | End: 2023-02-22
Payer: MEDICAID

## 2023-02-22 DIAGNOSIS — G56.03 BILATERAL CARPAL TUNNEL SYNDROME: Primary | ICD-10-CM

## 2023-02-22 PROCEDURE — 97110 THERAPEUTIC EXERCISES: CPT | Performed by: PHYSICAL THERAPIST

## 2023-02-22 PROCEDURE — 97161 PT EVAL LOW COMPLEX 20 MIN: CPT | Performed by: PHYSICAL THERAPIST

## 2023-02-22 NOTE — THERAPY EVALUATION
"    Outpatient Physical Therapy Ortho Initial Evaluation  [facility]     Patient Name: Susan Gomes  : 1975  MRN: [id number]  Today's Date: 2023      Visit Date: 2023    Patient Active Problem List   Diagnosis   • Menorrhagia with regular cycle        Past Medical History:   Diagnosis Date   • Abnormal vaginal bleeding    • Achilles tendon pain     LEFT   • Acid reflux    • Anemia    • Asthma    • History of MRSA infection 2016    UNDER LT ARM   • Hypertension    • Hypothyroidism     HYPOTHYROID   • Seasonal allergies         Past Surgical History:   Procedure Laterality Date   •  SECTION WITH TUBAL  1999   • CHOLECYSTECTOMY     • D&C HYSTEROSCOPY ENDOMETRIAL ABLATION N/A 2017    Procedure: HYSTEROSCOPY WITH NOVASURE ENDOMETRIAL ABLATION ;  Surgeon: Christian Mistry MD;  Location: [facility] OR;  Service:    • THYROIDECTOMY, PARTIAL         Visit Dx:     ICD-10-CM ICD-9-CM   1. Bilateral carpal tunnel syndrome  G56.03 354.0          Patient History     Row Name 23 1100             History    Chief Complaint Pain  -GR      Type of Pain Hand pain;Wrist pain  -GR      Date Current Problem(s) Began 94  -GR      Brief Description of Current Complaint States in  when she was pregnant shewoudlwakeup with pain in her hands. She had her hands \"wrapped\" for carpal tunnel. She states she has pain since.  She has tried a steroid and pain medication in the past with some relief. Her pain has progressed over the last 3 years. C/o pins/needles/pulsation constantly. Her sleep is disturbed. She has worn night splints for years - some relief. She isn't currently working and  her family feels she shouldn't because of how it aggravates her pain. She has osteoarthritis and has recently seen a rheumatologist in [facility].  -GR      Hand Dominance right-handed  -GR      Occupation/sports/leisure activities cooking/household activities  -GR         Pain     Pain Location Hand  " -GR      Pain at Present 7  -GR      Pain at Best 3  -GR      Pain at Worst 9  -GR      Is your sleep disturbed? Yes  -GR         Fall Risk Assessment    Any falls in the past year: No  -GR         Services    Do you plan to receive Home Health services in the near future No  -GR         Daily Activities    Primary Language Kiswahili  -GR      Action taken if English not primary language speaks English as well  -GR      How does patient learn best? Demonstration  -GR      Barriers to learning Language  -GR      Pt Participated in POC and Goals Yes  -GR         Safety    Are you being hurt, hit, or frightened by anyone at home or in your life? No  -GR      Are you being neglected by a caregiver No  -GR            User Key  (r) = Recorded By, (t) = Taken By, (c) = Cosigned By    Initials Name Provider Type    Ovidio Clarke, PT Physical Therapist                 PT Ortho     Row Name 02/22/23 1100       General ROM    RT Upper Ext Rt Wrist Flexion;Rt Wrist Extension;Rt Ulnar Deviation;Rt Radial Deviation  -GR    LT Upper Ext Lt Wrist Flexion;Lt Wrist Extension;Lt Ulnar Deviation;Lt Radial Deviation  -GR       Right Upper Ext    Rt Wrist Flexion AROM 35  -GR    Rt Wrist Extension AROM 65  -GR    Rt  Ulnar Deviation AROM 15  -GR    Rt  Radial Deviation AROM 15  -GR       Left Upper Ext    Lt Wrist Flexion AROM 30  -GR    Lt Wrist Extension AROM 40  -GR    Lt  Ulnar Deviation AROM 15  -GR    Lt  Radial Deviation AROM 30  -GR       MMT (Manual Muscle Testing)    Rt Upper Ext Rt Wrist Flexion;Rt Wrist Extension;Rt Wrist Flexion & Radial Deviation;Rt Wrist Extension & Ulnar Deviation  -GR    Lt Upper Ext Lt Wrist Flexion;Lt Wrist Extension;Lt Wrist Flexion & Radial Deviation;Lt Wrist Extension & Ulnar Deviation  -GR       MMT Right Upper Ext    Rt Wrist Flexion MMT, Gross Movement (4-/5) good minus  -GR    Rt Wrist Flexion & Radial Deviation MMT, Gross Movement (3+/5) fair plus  -GR    Rt Wrist Extension MMT, Gross  Movement (4/5) good  -GR    Rt Wrist Extension & Ulnar Deviation MMT, Gross Movement (3+/5) fair plus  -GR       MMT Left Upper Ext    Lt Wrist Flexion MMT, Gross Movement (4-/5) good minus  -GR    Lt Wrist Flexion & Radial Deviation MMT, Gross Movement (3+/5) fair plus  -GR    Lt Wrist Extension MMT, Gross Movement (3+/5) fair plus  -GR    Lt Wrist Extension & Ulnar Deviation MMT, Gross Movement: (3+/5) fair plus  -GR        Strength Right    # Reps 3  -GR    Right Rung 2  -GR    Right  Test 1 25  -GR    Right  Test 2 20  -GR    Right  Test 3 20  -GR     Strength Average Right 21.67  -GR        Strength Left    # Reps 3  -GR    Left Rung 2  -GR    Left  Test 1 15  -GR    Left  Test 2 11  -GR    Left  Test 3 8  -GR     Strength Average Left 11.33  -GR       Hand  Strength     Strength Affected Side Bilateral  -GR          User Key  (r) = Recorded By, (t) = Taken By, (c) = Cosigned By    Initials Name Provider Type    Ovidio Clarke, PT Physical Therapist                            Therapy Education  Education Details: [facility] HEP  [id number], expectations, POC      PT OP Goals     Row Name 02/22/23 1300          PT Short Term Goals    STG Date to Achieve 03/24/23  -GR     STG 1 Patient will be independent with initial HEP.  -GR     STG 1 Progress New  -GR     STG 2 Patient will demonstrate 5lb or greater improvement in B  strength to normalize object management.  -GR     STG 2 Progress New  -GR        Long Term Goals    LTG Date to Achieve 04/23/23  -GR     LTG 1 Patient will report 25% or greater improvement in sleeping.  -GR     LTG 1 Progress New  -GR     LTG 2 Patient will be independent with progressive HEP for long term condition management.  -GR     LTG 2 Progress New  -GR     LTG 3 Patient will score  </=49% disability on the quickDASH to indicate improved perceived ADL performance.  -GR     LTG 3 Progress New  -GR        Time Calculation    PT Goal  Re-Cert Due Date 05/23/23  -GR           User Key  (r) = Recorded By, (t) = Taken By, (c) = Cosigned By    Initials Name Provider Type    GR Ovidio Ridley, PT Physical Therapist                 PT Assessment/Plan     Row Name 02/22/23 1300          PT Assessment    Functional Limitations Limitation in home management;Limitations in community activities;Limitations in functional capacity and performance;Performance in leisure activities;Performance in self-care ADL;Performance in work activities  -GR     Impairments Pain;Muscle strength;Posture;Poor body mechanics;Peripheral nerve integrity;Range of motion  -GR     Assessment Comments 47 y/o F with c/o chronic B wrist pain/carpal tunnel dating back to 1994 and a progressive 3 year worsening.  She presents with decreased wrist ROM bilaterally, decreased wrist and  strength and pain with all movement/activities. PMH pertinent for obesity, chronic pain.  She remains active in her home cooking and doing houswork but with pain. Recommend skilled PT trialed to address her functional deficits. Thank you for this referral.  -GR     Please refer to paper survey for additional self-reported information No  -GR     Rehab Potential Good  -GR     Patient/caregiver participated in establishment of treatment plan and goals Yes  -GR     Patient would benefit from skilled therapy intervention Yes  -GR        PT Plan    PT Frequency 2x/week  -GR     Predicted Duration of Therapy Intervention (PT) 8 visits  -GR     Planned CPT's? PT EVAL LOW COMPLEXITY: 35449;PT RE-EVAL: 55966;PT THER PROC EA 15 MIN: 44739;PT THER ACT EA 15 MIN: 50017;PT MANUAL THERAPY EA 15 MIN: 11083;PT NEUROMUSC RE-EDUCATION EA 15 MIN: 59712;PT SELF CARE/HOME MGMT/TRAIN EA 15: 60419;PT ELECTRICAL STIM UNATTEND: ;PT ULTRASOUND EA 15 MIN: 81357  -GR     Physical Therapy Interventions (Optional Details) home exercise program;manual therapy techniques;modalities;neuromuscular re-education;patient/family  education;postural re-education;ROM (Range of Motion);strengthening;stretching  -GR     PT Plan Comments Begin with HEP review and progression. Consider teaching self massage techniques.  -GR           User Key  (r) = Recorded By, (t) = Taken By, (c) = Cosigned By    Initials Name Provider Type    Ovidio Clarke, PT Physical Therapist                   OP Exercises     Row Name 02/22/23 1347 02/22/23 1300          Total Minutes    11144 - PT Therapeutic Exercise Minutes 8  -GR --        Exercise 1    Exercise Name 1 -- Reverse prayer stretch  -GR     Cueing 1 -- Demo  -GR        Exercise 2    Exercise Name 2 -- Prayer stretch  -GR     Cueing 2 -- Demo  -GR        Exercise 3    Exercise Name 3 -- Wrist flexion stretch  -GR     Cueing 3 -- Demo  -GR        Exercise 4    Exercise Name 4 -- Resisted wrist flex/ext  -GR     Cueing 4 -- Demo  -GR     Additional Comments -- 1#  -GR        Exercise 5    Exercise Name 5 -- Reverse shoulder rolls  -GR     Cueing 5 -- Demo  -GR        Exercise 6    Exercise Name 6 -- Scapular retraction  -GR     Cueing 6 -- Demo  -GR        Exercise 7    Exercise Name 7 --  towel  -GR     Cueing 7 -- Demo  -GR           User Key  (r) = Recorded By, (t) = Taken By, (c) = Cosigned By    Initials Name Provider Type    Ovidio Clarke, PT Physical Therapist                              Outcome Measure Options: Quick DASH  Quick DASH  Open a tight or new jar.: Severe Difficulty  Do heavy household chores (e.g., wash walls, wash floors): No Difficulty  Carry a shopping bag or briefcase: No Difficulty  Wash your back: Mild Difficulty  Use a knife to cut food: No Difficulty  Recreational activities in which you take some force or impact through your arm, should or hand (e.g. golf, hammering, tennis, etc.): Severe Difficulty  During the past week, to what extent has your arm, shoulder, or hand problem interfered with your normal social activites with family, friends, neighbors or  groups?: Extremely  During the past week, were you limited in your work or other regular daily activities as a result of your arm, shoulder or hand problem?: Very limited  Arm, Shoulder, or hand pain: Extreme  Tingling (pins and needles) in your arm, shoulder, or hand: Extreme  During the past week, how much difficulty have you had sleeping because of the pain in your arm, shoulder or hand?: So much Difficulty that I can't sleep  Number of Questions Answered: 11  Quick DASH Score: 59.09         Time Calculation:     Start Time: 1130  Stop Time: 1203  Time Calculation (min): 33 min  Total Timed Code Minutes- PT: 8 minute(s)  Timed Charges  23038 - PT Therapeutic Exercise Minutes: 8  Untimed Charges  PT Eval/Re-eval Minutes: 25  Total Minutes  Timed Charges Total Minutes: 8  Untimed Charges Total Minutes: 25   Total Minutes: 33     Therapy Charges for Today     Code Description Service Date Service Provider Modifiers Qty    70965867431 HC PT THER PROC EA 15 MIN 2/22/2023 Ovidio Ridley, PT GP 1    02200087942 HC PT EVAL LOW COMPLEXITY 1 2/22/2023 Ovidio Ridley, PT GP 1          PT G-Codes  Outcome Measure Options: Quick DASH  Quick DASH Score: 59.09         Ovidio Ridley, PT  2/22/2023

## (undated) DEVICE — LOU D & C HYSTEROSCOPY: Brand: MEDLINE INDUSTRIES, INC.

## (undated) DEVICE — STRAP STIRUP SLP RNG 19X3.5IN DISP

## (undated) DEVICE — PROB ABL ENDOMTRL NOVASURE/G4 W/SURESND

## (undated) DEVICE — GLV SURG BIOGEL LTX PF 7 1/2

## (undated) DEVICE — ST IRR CYSTO W/SPK 77IN LF

## (undated) DEVICE — SOL NACL 0.9PCT 1000ML

## (undated) DEVICE — PAD SANI MAXI W/ADHS SNG WRP 11IN